# Patient Record
Sex: FEMALE | Race: WHITE | NOT HISPANIC OR LATINO | URBAN - METROPOLITAN AREA
[De-identification: names, ages, dates, MRNs, and addresses within clinical notes are randomized per-mention and may not be internally consistent; named-entity substitution may affect disease eponyms.]

---

## 2017-08-17 ENCOUNTER — OUTPATIENT (OUTPATIENT)
Dept: OUTPATIENT SERVICES | Facility: HOSPITAL | Age: 62
LOS: 1 days | Discharge: HOME | End: 2017-08-17
Payer: COMMERCIAL

## 2017-08-17 DIAGNOSIS — R13.10 DYSPHAGIA, UNSPECIFIED: ICD-10-CM

## 2017-08-17 PROCEDURE — 88305 TISSUE EXAM BY PATHOLOGIST: CPT

## 2017-08-17 PROCEDURE — 88312 SPECIAL STAINS GROUP 1: CPT | Mod: 26

## 2017-08-17 PROCEDURE — 88305 TISSUE EXAM BY PATHOLOGIST: CPT | Mod: 26

## 2017-08-17 PROCEDURE — 88312 SPECIAL STAINS GROUP 1: CPT

## 2017-08-17 PROCEDURE — 43239 EGD BIOPSY SINGLE/MULTIPLE: CPT

## 2017-08-18 LAB — SURGICAL PATHOLOGY FINAL REPORT - CH: SIGNIFICANT CHANGE UP

## 2017-08-24 DIAGNOSIS — D13.0 BENIGN NEOPLASM OF ESOPHAGUS: ICD-10-CM

## 2017-08-24 DIAGNOSIS — K44.9 DIAPHRAGMATIC HERNIA WITHOUT OBSTRUCTION OR GANGRENE: ICD-10-CM

## 2017-08-24 DIAGNOSIS — K29.50 UNSPECIFIED CHRONIC GASTRITIS WITHOUT BLEEDING: ICD-10-CM

## 2017-08-24 DIAGNOSIS — K21.9 GASTRO-ESOPHAGEAL REFLUX DISEASE WITHOUT ESOPHAGITIS: ICD-10-CM

## 2017-08-24 DIAGNOSIS — Z88.5 ALLERGY STATUS TO NARCOTIC AGENT: ICD-10-CM

## 2017-08-24 DIAGNOSIS — J45.909 UNSPECIFIED ASTHMA, UNCOMPLICATED: ICD-10-CM

## 2017-08-24 DIAGNOSIS — Z88.3 ALLERGY STATUS TO OTHER ANTI-INFECTIVE AGENTS: ICD-10-CM

## 2020-06-05 ENCOUNTER — EMERGENCY (EMERGENCY)
Facility: HOSPITAL | Age: 65
LOS: 1 days | Discharge: HOME | End: 2020-06-05
Attending: EMERGENCY MEDICINE | Admitting: EMERGENCY MEDICINE
Payer: COMMERCIAL

## 2020-06-05 VITALS
SYSTOLIC BLOOD PRESSURE: 137 MMHG | WEIGHT: 229.94 LBS | OXYGEN SATURATION: 98 % | RESPIRATION RATE: 16 BRPM | HEIGHT: 70 IN | HEART RATE: 85 BPM | DIASTOLIC BLOOD PRESSURE: 83 MMHG | TEMPERATURE: 98 F

## 2020-06-05 VITALS
DIASTOLIC BLOOD PRESSURE: 79 MMHG | RESPIRATION RATE: 17 BRPM | SYSTOLIC BLOOD PRESSURE: 160 MMHG | HEART RATE: 78 BPM | TEMPERATURE: 98 F | OXYGEN SATURATION: 98 %

## 2020-06-05 PROCEDURE — 96372 THER/PROPH/DIAG INJ SC/IM: CPT

## 2020-06-05 PROCEDURE — 99284 EMERGENCY DEPT VISIT MOD MDM: CPT

## 2020-06-05 PROCEDURE — 99283 EMERGENCY DEPT VISIT LOW MDM: CPT | Mod: 25

## 2020-06-05 PROCEDURE — 73562 X-RAY EXAM OF KNEE 3: CPT

## 2020-06-05 PROCEDURE — 73562 X-RAY EXAM OF KNEE 3: CPT | Mod: 26,RT

## 2020-06-05 RX ORDER — KETOROLAC TROMETHAMINE 30 MG/ML
30 SYRINGE (ML) INJECTION ONCE
Refills: 0 | Status: DISCONTINUED | OUTPATIENT
Start: 2020-06-05 | End: 2020-06-05

## 2020-06-05 RX ORDER — TRAVOPROST 0.04 MG/ML
1 SOLUTION/ DROPS OPHTHALMIC
Qty: 0 | Refills: 0 | DISCHARGE

## 2020-06-05 RX ORDER — PANTOPRAZOLE SODIUM 20 MG/1
1 TABLET, DELAYED RELEASE ORAL
Qty: 0 | Refills: 0 | DISCHARGE

## 2020-06-05 RX ADMIN — Medication 30 MILLIGRAM(S): at 17:47

## 2020-06-05 NOTE — ED PROVIDER NOTE - CLINICAL SUMMARY MEDICAL DECISION MAKING FREE TEXT BOX
64 yo F p/w pain to right knee after twisting it 2 days ago and exacerbated by fall today. MIld edema to Right knee, nontender. VSS. low suspicion for bony fx. likely soft tissue injury.   xray, analgesia, knee immbolizer, crutches. DC with pmd and ortho f/u for further imaging, if determined necessary by ortho.  Based on the H&P, I do not suspect any life- / limb- threatening pathology that requires further intervention at this time.

## 2020-06-05 NOTE — ED PROVIDER NOTE - PATIENT PORTAL LINK FT
You can access the FollowMyHealth Patient Portal offered by [facility] by registering at the following website: [email or web address]. By joining [facility] FollowMyHealth portal, you will also be able to view your health information using other applications (apps) compatible with our system.

## 2020-06-05 NOTE — ED ADULT NURSE NOTE - OBJECTIVE STATEMENT
Pt p/w R knee pain and swelling x 1 week. Per patient, stepped wrong on tuesday felt, went to urgent care was referred for MRI, knee pain has continued and today patient tripped and fell onto hardwood floor at home striking knee again. Denies any head trauma, LOC

## 2020-06-05 NOTE — ED PROVIDER NOTE - CARE PROVIDER_API CALL
Nguyễn Martinez  ORTHOPAEDIC SURGERY  [address] [place] [address]  Phone: [phone]  Fax: [phone]  Follow Up Time:

## 2020-06-05 NOTE — ED PROVIDER NOTE - OBJECTIVE STATEMENT
66 yo F p/w Right knee pain. 2 days ago twisted her knee while walking her dog; went to  where she received no imaging but was told she needed an MRI. Today she was walking up the stairs and fell onto buttocks and the pain in knee got worse. denies head trauma, loc, anticoagulation. took advil for pain, last dose around 1 pm about 4 hours ago. denies fever, cp, sob, recent travel.

## 2020-06-05 NOTE — ED ADULT NURSE NOTE - CHPI ED NUR SYMPTOMS NEG
no deformity/no tingling/no weakness/no difficulty bearing weight/no fever/no numbness/no stiffness/no bruising/no abrasion/no back pain

## 2020-06-05 NOTE — ED ADULT NURSE NOTE - NS ED NURSE DC INFO COMPLEXITY
Tim Francois(Attending) Moderate: Comprehensive teaching/Verbalized Understanding/Patient asked questions

## 2020-06-05 NOTE — ED PROVIDER NOTE - PROGRESS NOTE DETAILS
xray neg  Reevaluated patient at bedside.  Patient feeling well. Discussed the results of all diagnostic testing in ED and copies of all reports given.   An opportunity to ask questions was given.  Discussed the importance of prompt, close medical follow-up.  Patient will return with any changes, concerns or persistent / worsening symptoms.  Understanding of all instructions verbalized.